# Patient Record
Sex: FEMALE | Race: WHITE | Employment: OTHER | URBAN - NONMETROPOLITAN AREA
[De-identification: names, ages, dates, MRNs, and addresses within clinical notes are randomized per-mention and may not be internally consistent; named-entity substitution may affect disease eponyms.]

---

## 2021-12-03 ENCOUNTER — OFFICE VISIT (OUTPATIENT)
Dept: CARDIOLOGY CLINIC | Age: 66
End: 2021-12-03
Payer: MEDICARE

## 2021-12-03 VITALS
OXYGEN SATURATION: 95 % | HEIGHT: 63 IN | WEIGHT: 148 LBS | SYSTOLIC BLOOD PRESSURE: 118 MMHG | BODY MASS INDEX: 26.22 KG/M2 | DIASTOLIC BLOOD PRESSURE: 60 MMHG | HEART RATE: 65 BPM

## 2021-12-03 DIAGNOSIS — R94.39 ABNORMAL STRESS TEST: ICD-10-CM

## 2021-12-03 DIAGNOSIS — R06.02 SHORTNESS OF BREATH: Primary | ICD-10-CM

## 2021-12-03 DIAGNOSIS — Z72.0 TOBACCO ABUSE: ICD-10-CM

## 2021-12-03 DIAGNOSIS — E78.49 OTHER HYPERLIPIDEMIA: ICD-10-CM

## 2021-12-03 DIAGNOSIS — I10 PRIMARY HYPERTENSION: ICD-10-CM

## 2021-12-03 PROCEDURE — 4004F PT TOBACCO SCREEN RCVD TLK: CPT | Performed by: INTERNAL MEDICINE

## 2021-12-03 PROCEDURE — 93000 ELECTROCARDIOGRAM COMPLETE: CPT | Performed by: INTERNAL MEDICINE

## 2021-12-03 PROCEDURE — 99204 OFFICE O/P NEW MOD 45 MIN: CPT | Performed by: INTERNAL MEDICINE

## 2021-12-03 PROCEDURE — G8419 CALC BMI OUT NRM PARAM NOF/U: HCPCS | Performed by: INTERNAL MEDICINE

## 2021-12-03 PROCEDURE — G8484 FLU IMMUNIZE NO ADMIN: HCPCS | Performed by: INTERNAL MEDICINE

## 2021-12-03 PROCEDURE — G8427 DOCREV CUR MEDS BY ELIG CLIN: HCPCS | Performed by: INTERNAL MEDICINE

## 2021-12-03 PROCEDURE — 3017F COLORECTAL CA SCREEN DOC REV: CPT | Performed by: INTERNAL MEDICINE

## 2021-12-03 RX ORDER — IPRATROPIUM BROMIDE AND ALBUTEROL SULFATE 2.5; .5 MG/3ML; MG/3ML
1 SOLUTION RESPIRATORY (INHALATION) EVERY 4 HOURS
COMMUNITY

## 2021-12-03 RX ORDER — IBUPROFEN 600 MG/1
600 TABLET ORAL EVERY 6 HOURS PRN
COMMUNITY

## 2021-12-03 RX ORDER — ROSUVASTATIN CALCIUM 10 MG/1
10 TABLET, COATED ORAL NIGHTLY
Qty: 90 TABLET | Refills: 3 | Status: SHIPPED | OUTPATIENT
Start: 2021-12-03

## 2021-12-03 RX ORDER — METOPROLOL SUCCINATE 100 MG/1
100 TABLET, EXTENDED RELEASE ORAL DAILY
COMMUNITY

## 2021-12-03 RX ORDER — PAROXETINE HYDROCHLORIDE 20 MG/1
20 TABLET, FILM COATED ORAL EVERY MORNING
COMMUNITY

## 2021-12-03 RX ORDER — OMEPRAZOLE 40 MG/1
40 CAPSULE, DELAYED RELEASE ORAL DAILY
COMMUNITY

## 2021-12-03 RX ORDER — HYDROCHLOROTHIAZIDE 25 MG/1
25 TABLET ORAL DAILY
COMMUNITY

## 2021-12-03 RX ORDER — BUDESONIDE 0.5 MG/2ML
1 INHALANT ORAL 2 TIMES DAILY
COMMUNITY

## 2021-12-03 NOTE — PROGRESS NOTES
[facility]   Cardiac Evaluation      Patient: Masha Ocasio  YOB: 1955         Chief Complaint   Patient presents with    Shortness of Breath        Referring provider: Rhianna Bañuelos MD    History of Present Illness:   Ms Donnella Favre is seen today at the request of Dr Barb Choudhury. She is seen following abnormal lexiscan stress test at [facility] showing reversible anterior ischemia. . She states she is short of breath with walking. She states she has COPD and coughs a lot from this. Gideon Wheeler states her shortness of breath has worsened recently. She is using a nebulizer at home. Gideon Wheeler has extensive PVD w/ aortobifem redo  and re-do left femoral to below knee reverse greater saphenous graft on 8/4/15 which she states has been done 3 times. Other history includes HTN, HLD, and arthritis. She is long time smoker and smokes 1 PPD. Brother  in his 63's of heart disease. Her oldest son has a coronary stent done in his 29's. Gideon Wheeler denies palpitations, dizziness, or edema. She is not taking Crestor as previously prescribed because she though it was causing fatigue. Past Medical History:   has a past medical history of Arthritis, Asthma, Hyperlipidemia, Hypertension, and Nausea & vomiting. Surgical History:   has a past surgical history that includes vascular surgery (); back surgery (); Cholecystectomy; femoral bypass (10-13-11); Appendectomy; Tubal ligation; Colonoscopy; Endoscopy, colon, diagnostic; other surgical history; and other surgical history (Left, 13).      Current Outpatient Medications   Medication Sig Dispense Refill    omeprazole (PRILOSEC) 40 MG delayed release capsule Take 40 mg by mouth daily      ibuprofen (ADVIL;MOTRIN) 600 MG tablet Take 600 mg by mouth every 6 hours as needed for Pain      PARoxetine (PAXIL) 20 MG tablet Take 20 mg by mouth every morning      aspirin 325 MG EC tablet Take 325 mg by mouth daily      metoprolol succinate (TOPROL XL) 100 MG extended release tablet Take 100 mg by mouth daily      hydroCHLOROthiazide (HYDRODIURIL) 25 MG tablet Take 25 mg by mouth daily      budesonide (PULMICORT) 0.5 MG/2ML nebulizer suspension Take 1 ampule by nebulization 2 times daily      ipratropium-albuterol (DUONEB) 0.5-2.5 (3) MG/3ML SOLN nebulizer solution Inhale 1 vial into the lungs every 4 hours      losartan (COZAAR) 100 MG tablet Take 100 mg by mouth daily.  MULTIPLE VITAMIN PO Take  by mouth. Multiple vitamin with iron      albuterol (PROVENTIL HFA;VENTOLIN HFA) 108 (90 BASE) MCG/ACT inhaler Inhale 2 puffs into the lungs every 4 hours as needed. No current facility-administered medications for this visit. Allergies:  Codeine and Dye [iodides]     Social History:  Social History     Socioeconomic History    Marital status:      Spouse name: Not on file    Number of children: Not on file    Years of education: Not on file    Highest education level: Not on file   Occupational History    On SS disability    Tobacco Use    Smoking status: Current Every Day Smoker     Packs/day: 0.50     Years: 35.00     Pack years: 17.50    Smokeless tobacco: Never Used   Substance and Sexual Activity    Alcohol use: Yes     Comment: rare    Drug use: Not on file    Sexual activity: Not on file   Other Topics Concern    Not on file   Social History Narrative    Not on file     Social Determinants of Health     Financial Resource Strain:     Difficulty of Paying Living Expenses: Not on file   Food Insecurity:     Worried About Running Out of Food in the Last Year: Not on file    Kristi of Food in the Last Year: Not on file   Transportation Needs:     Lack of Transportation (Medical): Not on file    Lack of Transportation (Non-Medical):  Not on file   Physical Activity:     Days of Exercise per Week: Not on file    Minutes of Exercise per Session: Not on file   Stress:     Feeling of Stress : Not on file   Social Connections:     Frequency of Communication with Friends and Family: Not on file    Frequency of Social Gatherings with Friends and Family: Not on file    Attends Catholic Services: Not on file    Active Member of Clubs or Organizations: Not on file    Attends Club or Organization Meetings: Not on file    Marital Status: Not on file   Intimate Partner Violence:     Fear of Current or Ex-Partner: Not on file    Emotionally Abused: Not on file    Physically Abused: Not on file    Sexually Abused: Not on file   Housing Stability:     Unable to Pay for Housing in the Last Year: Not on file    Number of [place] in the Last Year: Not on file    Unstable Housing in the Last Year: Not on file       Family History:   Family History   Problem Relation Age of Onset    Diabetes Maternal Grandmother      Family history has been reviewed and not pertinent except as noted above. Review of Systems:   · Constitutional: there has been no unanticipated weight loss. No change in energy or activity level   · Eyes: No visual changes   · ENT: No Headaches, hearing loss or vertigo. No mouth sores or sore throat. · Cardiovascular: Reviewed in HPI  · Respiratory: No cough or wheezing, no sputum production. · Gastrointestinal: No abdominal pain, appetite loss, blood in stools. No change in bowel or bladder habits. · Genitourinary: No nocturia, dysuria, trouble voiding  · Musculoskeletal:  No gait disturbance, weakness or joint complaints. · Integumentary: No rash or pruritis. · Neurological: No headache, change in muscle strength, numbness or tingling. No change in gait, balance, coordination, mood, affect, memory, mentation, behavior. · Psychiatric: No anxiety or depression  · Endocrine: No malaise or fever  · Hematologic/Lymphatic: No abnormal bruising or bleeding, blood clots or swollen lymph nodes. · Allergic/Immunologic: No nasal congestion or hives.     Physical Examination:    Vitals:    12/03/21 6072 BP: 118/60   Site: Left Upper Arm   Position: Sitting   Cuff Size: Medium Adult   Pulse: 65   SpO2: 95%   Weight: 148 lb (67.1 kg)   Height: 5' 3\" (1.6 m)     Body mass index is 26.22 kg/m². Wt Readings from Last 3 Encounters:   12/03/21 148 lb (67.1 kg)   02/07/13 138 lb 10.7 oz (62.9 kg)      BP Readings from Last 3 Encounters:   12/03/21 118/60   02/07/13 92/57        Physical Examination:    · CONSTITUTIONAL: Well developed, well nourished, appears older than stated age, pale. · EYES: PERRLA. No xanthelasma, sclera non icteric  · EARS,NOSE,MOUTH,THROAT:  Mucous membranes moist, normal hearing  · NECK: Supple, JVP normal, thyroid not enlarged. Carotids 2+ without bruits  · RESPIRATORY: Normal effort, no rales or rhonchi  · CARDIOVASCULAR: Normal PMI, regular rate and rhythm, no murmurs, rub or gallop. No edema. Radial pulses present and equal  · CHEST: No scar or masses  · ABDOMEN: Normal bowel sounds. No masses or tenderness. mid line abdominal scar, bilateral femoral scars with short femoral bruits 2+  · MUSCULOSKELETAL: No clubbing or cyanosis. Moves all extremities well. Normal gait  · SKIN:  Warm and dry. No rashes  · NEUROLOGIC: Cranial nerves intact. Alert and oriented  · PSYCHIATRIC: Calm affect. Appears to have normal judgement and insight    Rt DP and PT 2+, Lt PT 1+ and DP 1+    Assessment/Plan  1. Shortness of breath - with exertion, O2 sats at rest 95%, after walking O2 sats 90% but recovered quickly. She is very pale but her cbc is normal.    2. Primary hypertension - well controlled ; kidney function Is normal.    3. Other hyperlipidemia - not currently on statin therapy; she stopped crestor due to fatigue but needs to restart. 4. Abnormal stress test  - with shortness of breath will evaluate with [facility].    GXT 11/24/21: minimal reversible perfusion defect of anterior apical region suggesting mild stress induced reversible ischemia; normal cath in 2004  Echo 11/9/21: EF 55-60%, aortic valve mildly calcified, mild AR, intermediate diastolic function   5. Tobacco abuse - long time smoker but quit recently. 6. PVD - extensive with multiple angiograms in the past, aortobifem redo 2004 and re-do left femoral to below knee reverse greater saphenous graft on 8/4/15. She states she has had hives from angiographic dye. EKG today is normal.     PLAN:  Recent stress test explained. It is recommended she have a LHC thru the radial artery if possible. . She has a dye allergy so will need pre-medication. We will schedule with Dr Hussein Raya and she can f/u in [facility]. I have re-prescibed her crestor and told her to take it because it does not cause fatigue. Scribe's attestation: This note was scribed in the presence of Dr Jona Srinivasan MD by Phil Halsted, RN. The scribe's documentation has been prepared under my direction and personally reviewed by me in its entirety. I confirm that the note above accurately reflects all work, treatment, procedures, and medical decision making performed by me. Thank you for allowing to me to participate in the care of Eleonora Hidalgo.

## 2021-12-08 ENCOUNTER — HOSPITAL ENCOUNTER (OUTPATIENT)
Dept: CARDIAC CATH/INVASIVE PROCEDURES | Age: 66
Discharge: HOME | End: 2021-12-08
Attending: INTERNAL MEDICINE | Admitting: INTERNAL MEDICINE
Payer: MEDICARE

## 2021-12-08 VITALS
HEIGHT: 63 IN | DIASTOLIC BLOOD PRESSURE: 73 MMHG | SYSTOLIC BLOOD PRESSURE: 141 MMHG | TEMPERATURE: 98 F | HEART RATE: 66 BPM | OXYGEN SATURATION: 98 % | BODY MASS INDEX: 26.22 KG/M2 | WEIGHT: 148 LBS

## 2021-12-08 LAB
ANION GAP SERPL CALCULATED.3IONS-SCNC: 9 MMOL/L (ref 3–16)
BUN BLDV-MCNC: 6 MG/DL (ref 7–20)
CALCIUM SERPL-MCNC: 9.2 MG/DL (ref 8.3–10.6)
CHLORIDE BLD-SCNC: 95 MMOL/L (ref 99–110)
CO2: 29 MMOL/L (ref 21–32)
CREAT SERPL-MCNC: 0.6 MG/DL (ref 0.6–1.2)
GFR AFRICAN AMERICAN: >60
GFR NON-AFRICAN AMERICAN: >60
GLUCOSE BLD-MCNC: 97 MG/DL (ref 70–99)
HCT VFR BLD CALC: 36 % (ref 36–48)
HEMOGLOBIN: 12.3 G/DL (ref 12–16)
LEFT VENTRICULAR EJECTION FRACTION MODE: NORMAL
LV EF: 60 %
MCH RBC QN AUTO: 31.7 PG (ref 26–34)
MCHC RBC AUTO-ENTMCNC: 34.1 G/DL (ref 31–36)
MCV RBC AUTO: 93 FL (ref 80–100)
PDW BLD-RTO: 12.8 % (ref 12.4–15.4)
PLATELET # BLD: 415 K/UL (ref 135–450)
PMV BLD AUTO: 7 FL (ref 5–10.5)
POTASSIUM SERPL-SCNC: 4 MMOL/L (ref 3.5–5.1)
RBC # BLD: 3.87 M/UL (ref 4–5.2)
SODIUM BLD-SCNC: 133 MMOL/L (ref 136–145)
WBC # BLD: 7.1 K/UL (ref 4–11)

## 2021-12-08 PROCEDURE — C1769 GUIDE WIRE: HCPCS

## 2021-12-08 PROCEDURE — 80048 BASIC METABOLIC PNL TOTAL CA: CPT

## 2021-12-08 PROCEDURE — 93458 L HRT ARTERY/VENTRICLE ANGIO: CPT

## 2021-12-08 PROCEDURE — 6360000002 HC RX W HCPCS

## 2021-12-08 PROCEDURE — 2500000003 HC RX 250 WO HCPCS

## 2021-12-08 PROCEDURE — 93458 L HRT ARTERY/VENTRICLE ANGIO: CPT | Performed by: INTERNAL MEDICINE

## 2021-12-08 PROCEDURE — 85027 COMPLETE CBC AUTOMATED: CPT

## 2021-12-08 PROCEDURE — 2580000003 HC RX 258

## 2021-12-08 PROCEDURE — 93571 IV DOP VEL&/PRESS C FLO 1ST: CPT | Performed by: INTERNAL MEDICINE

## 2021-12-08 PROCEDURE — 99152 MOD SED SAME PHYS/QHP 5/>YRS: CPT | Performed by: INTERNAL MEDICINE

## 2021-12-08 PROCEDURE — C1894 INTRO/SHEATH, NON-LASER: HCPCS

## 2021-12-08 PROCEDURE — 93005 ELECTROCARDIOGRAM TRACING: CPT | Performed by: INTERNAL MEDICINE

## 2021-12-08 PROCEDURE — 2709999900 HC NON-CHARGEABLE SUPPLY

## 2021-12-08 PROCEDURE — 93571 IV DOP VEL&/PRESS C FLO 1ST: CPT

## 2021-12-08 PROCEDURE — 6360000004 HC RX CONTRAST MEDICATION: Performed by: INTERNAL MEDICINE

## 2021-12-08 PROCEDURE — 99152 MOD SED SAME PHYS/QHP 5/>YRS: CPT

## 2021-12-08 PROCEDURE — 36415 COLL VENOUS BLD VENIPUNCTURE: CPT

## 2021-12-08 RX ORDER — ONDANSETRON 2 MG/ML
4 INJECTION INTRAMUSCULAR; INTRAVENOUS EVERY 6 HOURS PRN
Status: CANCELLED | OUTPATIENT
Start: 2021-12-08

## 2021-12-08 RX ORDER — SODIUM CHLORIDE 0.9 % (FLUSH) 0.9 %
5-40 SYRINGE (ML) INJECTION PRN
Status: DISCONTINUED | OUTPATIENT
Start: 2021-12-08 | End: 2021-12-08 | Stop reason: HOSPADM

## 2021-12-08 RX ORDER — SODIUM CHLORIDE 9 MG/ML
25 INJECTION, SOLUTION INTRAVENOUS PRN
Status: DISCONTINUED | OUTPATIENT
Start: 2021-12-08 | End: 2021-12-08 | Stop reason: HOSPADM

## 2021-12-08 RX ORDER — SODIUM CHLORIDE 9 MG/ML
INJECTION, SOLUTION INTRAVENOUS CONTINUOUS
Status: DISCONTINUED | OUTPATIENT
Start: 2021-12-08 | End: 2021-12-08 | Stop reason: HOSPADM

## 2021-12-08 RX ORDER — SODIUM CHLORIDE 0.9 % (FLUSH) 0.9 %
5-40 SYRINGE (ML) INJECTION EVERY 12 HOURS SCHEDULED
Status: DISCONTINUED | OUTPATIENT
Start: 2021-12-08 | End: 2021-12-08 | Stop reason: HOSPADM

## 2021-12-08 RX ADMIN — IOPAMIDOL 46 ML: 755 INJECTION, SOLUTION INTRAVENOUS at 09:31

## 2021-12-08 NOTE — OP NOTE
[facility]  Procedure Note    Procedure: [facility]  Indication: UA/Abnormal stress    Procedure Details:  Consent Access Bleed R Sedat Start Stop Versed Fentanyl Contrast Fluoro EBL Comp Spec   Yes RRA Low  [facility] [place] 0904 0926 1.5 100 46 3.8 <20 None None   *Ultrasound Note: Ultrasound guidance used to determine aforementioned artery patency, size (>2mm), anatomic variations and ideal puncture location. Real-time ultrasound utilized concurrent with vascular needle entry into the artery. Image(s) permanently recorded and reported in the patient chart.   *Sedation Note: MCSFC = minimal conscious sedation for comfort    Findings:  Artery Findings/Result   LM Normal   LAD Normal   Cx Normal, small   RI N/A   RCA 50% (FFR 0.95)   LVEDP 14   LVG 60%     Intervention:   FFR of RCA = 0.95 = not significant    Post Cath Dx:   Nonobstructive CAD

## 2021-12-08 NOTE — PRE SEDATION
Brief Pre-Op Note/Sedation Assessment      Keven Jones  1955  [id number]  7:42 AM    Planned Procedure: Cardiac Catheterization Procedure  Post Procedure Plan: Return to same level of care  Consent: I have discussed with the patient and/or the patient representative the indication, alternatives, and the possible risks and/or complications of the planned procedure and the anesthesia methods. The patient and/or patient representative appear to understand and agree to proceed. Chief Complaint:   Chest Pain/Pressure  Anginal Equivalent  Dyspnea on Exertion  Dyspnea    Indications for Cath Procedure:   Presentation:  New Onset Angina <= 2 months, Worsening Angina and Suspected CAD   Anginal Classification within 2 weeks:  CCS III - Symptoms with everyday living activities, i.e., moderate limitation   Angina Symptoms Assessment:  Typical Chest Pain   Heart Failure Class within last 2 weeks:  No symptoms   Cardiovascular Instability:  Yes    Prior Ischemic Workup/Eval:   Pre-Procedural Medications: Yes: Aspirin, Beta Blockers and STATIN   Stress Test Completed? Yes:  Stress or Imaging Studies Performed (within ANY time period):   Type:  Stress Nuclear  Results:  Positive:  Myocardial Perfusion Defects (Nuclear) Extent of Ischemia:  Intermediate    Does Patient need surgery?  Cath Valve Surgery:  No    Pre-Procedure Medical History:   Vital Signs:  LMP 02/04/2003    Allergies:  Reviewed in EMR   Medications:  Reviewed in EMR   Past Medical History:  Reviewed in EMR   Surgical History:  Reviewed in EMR    Pre-Sedation:   Pre-Sedation Documentation and Exam = I have personally completed a history, physical exam & review of systems for this patient (see notes).    Prior History of Anesthesia Complications = none   Modified Mallampati = II (soft palate, uvula, fauces visible)   ASA Classification = Class 2 - A normal healthy patient with mild systemic disease    Pankaj Scale:   Activity:  2 - Able to move 4 extremities voluntarily on command   Respiration:  2 - Able to breathe deeply and cough freely   Circulation:  2 - BP+/- 20mmHg of normal   Consciousness:  2 - Fully awake   Oxygen Saturation (color):  2 - Able to maintain oxygen saturation >92% on room air    Sedation/Anesthesia Plan:  Guard the patient's safety and welfare. Minimize physical discomfort and pain. Minimize negative psychological responses to treatment by providing sedation and analgesia and maximize the potential amnesia. Patient to meet pre-procedure discharge plan.     Medication Planned:  Midazolam intravenously and fentanyl intravenously    Patient is an appropriate candidate for plan of sedation:   Yes      Electronically signed by Samantha Meza RN on 12/8/2021 at 7:42 AM

## 2021-12-08 NOTE — H&P
[address]  H+P // Johnie Bean // OP VISIT // Marta Rivas MD  ENC TYPE FU    CC HX HPI   GEN  Here today for LHC for sob on exertion and abnormal stress test   CAD  Denies cp, sob, dizzy, syncope, palps. HTN  Amb bp in good range, no ha or dizzy. CHOL  Last chol reviewed, on statin w/out sa   MED  Compliant with CV meds listed below w/out reported sa. HISTORY/ALLLERGY/ROS   MedHx  has a past medical history of Arthritis, Asthma, Hyperlipidemia, Hypertension, and Nausea & vomiting. SurgHx  has a past surgical history that includes vascular surgery (2005); back surgery (2007); Cholecystectomy; femoral bypass (10-13-11); Appendectomy; Tubal ligation; Colonoscopy; Endoscopy, colon, diagnostic; other surgical history; and other surgical history (Left, 2/7/13). SocHx  reports that she has been smoking. She has a 17.50 pack-year smoking history. She has never used smokeless tobacco. She reports current alcohol use. FamHx family history includes Diabetes in her maternal grandmother.    Allerg Codeine and Dye [iodides]   ROS [x]Full ROS obtained and negative except as mentioned in HPI      MEDICATIONS      Current Facility-Administered Medications   Medication Dose Route Frequency Provider Last Rate Last Admin    0.9 % sodium chloride infusion   IntraVENous Continuous Bassam Castelan MD        sodium chloride flush 0.9 % injection 5-40 mL  5-40 mL IntraVENous 2 times per day Bassam Castelan MD        sodium chloride flush 0.9 % injection 5-40 mL  5-40 mL IntraVENous PRN Bassam Castelan MD        0.9 % sodium chloride infusion  25 mL IntraVENous PRN Bassam Castelan MD         Current Outpatient Medications   Medication Sig Dispense Refill    omeprazole (PRILOSEC) 40 MG delayed release capsule Take 40 mg by mouth daily      ibuprofen (ADVIL;MOTRIN) 600 MG tablet Take 600 mg by mouth every 6 hours as needed for Pain      PARoxetine (PAXIL) 20 MG tablet Take 20 mg by mouth every morning      aspirin 325 MG EC tablet Take 325 mg by mouth daily      metoprolol succinate (TOPROL XL) 100 MG extended release tablet Take 100 mg by mouth daily      hydroCHLOROthiazide (HYDRODIURIL) 25 MG tablet Take 25 mg by mouth daily      budesonide (PULMICORT) 0.5 MG/2ML nebulizer suspension Take 1 ampule by nebulization 2 times daily      ipratropium-albuterol (DUONEB) 0.5-2.5 (3) MG/3ML SOLN nebulizer solution Inhale 1 vial into the lungs every 4 hours      rosuvastatin (CRESTOR) 10 MG tablet Take 1 tablet by mouth nightly 90 tablet 3    losartan (COZAAR) 100 MG tablet Take 100 mg by mouth daily.  MULTIPLE VITAMIN PO Take  by mouth. Multiple vitamin with iron      albuterol (PROVENTIL HFA;VENTOLIN HFA) 108 (90 BASE) MCG/ACT inhaler Inhale 2 puffs into the lungs every 4 hours as needed.          [x]Reviewed with patient and will remain unchanged except as mentioned in A/P    PHYSICAL EXAM   Vitals:    12/08/21 0745   BP: (!) 141/73   Pulse: 66   Temp: 98 °F (36.7 °C)   SpO2: 98%        Gen Alert, coop, no distress Heart  rrr   Head NC, AT, no abnorm Abd  Soft, NT, +BS, no mass, no OM   Eyes PER, conj/corn clear Ext  Ext nl, AT, no C/C/E   Nose Nares nl, no drain, NT Pulse 2+ and symmetric   Throat Lips, mucosa, tongue nl Skin Col/text/turg nl, no vis rash/les   Neck S/S, TM, NT, no bruit/JVD Psych Nl mood and affect   Lung CTA-B, unlabored, no DTP Lymph   No cervical or axillary LA   Ch wall NT, no deform Neuro  Nl gross M/S exam       ASSESSMENT AND PLAN     *SOB   Date EF Results   Sx   SOB with exertion   Data   Most recent EKG, ECHO and LHC reviewed personally   MPI 11/21  Anterior apical mild ischemia   TTE 11/21 60%    Plan   Continue aggressive medical treatment at doses above\   *HTN  Status Controlled, vitals and available ambulatory monitoring logs reviewed personally  Plan Counseled on diet/salt/exercise/weight, continue meds at doses above  *CHOL  Status  uncontrolled with last LDL of 90(goal <70) and HDL of 47(2/19), labs reviewed personally  Plan Counseled on diet/exercise/weight, continue high-intensity statin, lipid/liver surveillance per PCP  *TOB  Status  active, available PFTs reviewed personally  Plan  Continued avoidance of 1st and 2nd hand smoke, counseled on risks/cessation  *PVD  Multiple procedures

## 2021-12-09 LAB
EKG ATRIAL RATE: 66 BPM
EKG DIAGNOSIS: NORMAL
EKG P AXIS: 86 DEGREES
EKG P-R INTERVAL: 174 MS
EKG Q-T INTERVAL: 410 MS
EKG QRS DURATION: 80 MS
EKG QTC CALCULATION (BAZETT): 429 MS
EKG R AXIS: 76 DEGREES
EKG T AXIS: 77 DEGREES
EKG VENTRICULAR RATE: 66 BPM

## 2021-12-09 PROCEDURE — 93010 ELECTROCARDIOGRAM REPORT: CPT | Performed by: INTERNAL MEDICINE
